# Patient Record
Sex: FEMALE | Race: WHITE | NOT HISPANIC OR LATINO | Employment: UNEMPLOYED | URBAN - METROPOLITAN AREA
[De-identification: names, ages, dates, MRNs, and addresses within clinical notes are randomized per-mention and may not be internally consistent; named-entity substitution may affect disease eponyms.]

---

## 2023-04-05 ENCOUNTER — APPOINTMENT (OUTPATIENT)
Dept: PRIMARY CARE | Facility: CLINIC | Age: 53
End: 2023-04-05
Payer: COMMERCIAL

## 2023-04-06 PROBLEM — R53.83 FATIGUE: Status: ACTIVE | Noted: 2023-04-06

## 2023-04-06 PROBLEM — N94.3 PREMENSTRUAL SYNDROME: Status: ACTIVE | Noted: 2023-04-06

## 2023-04-06 PROBLEM — S05.02XA LEFT CORNEAL ABRASION: Status: ACTIVE | Noted: 2023-04-06

## 2023-04-06 PROBLEM — B34.9 VIRAL ILLNESS: Status: ACTIVE | Noted: 2023-04-06

## 2023-04-06 PROBLEM — H57.10 EYE PAIN: Status: ACTIVE | Noted: 2023-04-06

## 2023-04-06 PROBLEM — E55.9 VITAMIN D DEFICIENCY: Status: ACTIVE | Noted: 2023-04-06

## 2023-04-06 PROBLEM — R10.9 ABDOMINAL PAIN: Status: ACTIVE | Noted: 2023-04-06

## 2023-04-06 PROBLEM — F41.9 ANXIETY: Status: ACTIVE | Noted: 2023-04-06

## 2023-04-06 RX ORDER — PAROXETINE 30 MG/1
30 TABLET, FILM COATED ORAL DAILY
COMMUNITY
End: 2023-04-12

## 2023-04-06 RX ORDER — BUSPIRONE HYDROCHLORIDE 5 MG/1
1 TABLET ORAL 2 TIMES DAILY
COMMUNITY
Start: 2022-05-23 | End: 2024-04-08 | Stop reason: ALTCHOICE

## 2023-04-07 ENCOUNTER — APPOINTMENT (OUTPATIENT)
Dept: PRIMARY CARE | Facility: CLINIC | Age: 53
End: 2023-04-07
Payer: COMMERCIAL

## 2023-04-07 DIAGNOSIS — F41.9 ANXIETY DISORDER, UNSPECIFIED: ICD-10-CM

## 2023-04-12 ENCOUNTER — APPOINTMENT (OUTPATIENT)
Dept: PRIMARY CARE | Facility: CLINIC | Age: 53
End: 2023-04-12
Payer: COMMERCIAL

## 2023-04-12 RX ORDER — PAROXETINE 30 MG/1
30 TABLET, FILM COATED ORAL DAILY
Qty: 90 TABLET | Refills: 1 | Status: SHIPPED | OUTPATIENT
Start: 2023-04-12 | End: 2023-09-21 | Stop reason: SDUPTHER

## 2023-09-20 ENCOUNTER — TELEPHONE (OUTPATIENT)
Dept: PRIMARY CARE | Facility: CLINIC | Age: 53
End: 2023-09-20
Payer: COMMERCIAL

## 2023-09-20 DIAGNOSIS — F41.9 ANXIETY DISORDER, UNSPECIFIED: ICD-10-CM

## 2023-09-20 NOTE — TELEPHONE ENCOUNTER
PT is coming in this Friday, but needs a few pills called in to get her to appointment.    PARoxetine (Paxil) 30 mg tablet

## 2023-09-22 ENCOUNTER — APPOINTMENT (OUTPATIENT)
Dept: PRIMARY CARE | Facility: CLINIC | Age: 53
End: 2023-09-22
Payer: COMMERCIAL

## 2023-09-22 RX ORDER — PAROXETINE 30 MG/1
30 TABLET, FILM COATED ORAL DAILY
Qty: 90 TABLET | Refills: 1 | Status: SHIPPED | OUTPATIENT
Start: 2023-09-22 | End: 2024-04-08 | Stop reason: SDUPTHER

## 2024-04-08 ENCOUNTER — OFFICE VISIT (OUTPATIENT)
Dept: PRIMARY CARE | Facility: CLINIC | Age: 54
End: 2024-04-08
Payer: COMMERCIAL

## 2024-04-08 ENCOUNTER — TELEPHONE (OUTPATIENT)
Dept: PRIMARY CARE | Facility: CLINIC | Age: 54
End: 2024-04-08

## 2024-04-08 VITALS
HEART RATE: 87 BPM | SYSTOLIC BLOOD PRESSURE: 129 MMHG | BODY MASS INDEX: 27.31 KG/M2 | OXYGEN SATURATION: 98 % | WEIGHT: 160 LBS | DIASTOLIC BLOOD PRESSURE: 78 MMHG | HEIGHT: 64 IN | TEMPERATURE: 97.1 F

## 2024-04-08 DIAGNOSIS — F41.8 DEPRESSION WITH ANXIETY: Primary | ICD-10-CM

## 2024-04-08 DIAGNOSIS — Z00.01 ANNUAL VISIT FOR GENERAL ADULT MEDICAL EXAMINATION WITH ABNORMAL FINDINGS: ICD-10-CM

## 2024-04-08 DIAGNOSIS — Z12.11 COLON CANCER SCREENING: ICD-10-CM

## 2024-04-08 DIAGNOSIS — F17.200 SMOKER: ICD-10-CM

## 2024-04-08 DIAGNOSIS — I10 HYPERTENSION, UNSPECIFIED TYPE: ICD-10-CM

## 2024-04-08 DIAGNOSIS — Z12.31 ENCOUNTER FOR SCREENING MAMMOGRAM FOR BREAST CANCER: ICD-10-CM

## 2024-04-08 DIAGNOSIS — F17.210 CONTINUOUS DEPENDENCE ON CIGARETTE SMOKING: ICD-10-CM

## 2024-04-08 DIAGNOSIS — Z12.2 ENCOUNTER FOR SCREENING FOR LUNG CANCER: ICD-10-CM

## 2024-04-08 DIAGNOSIS — F41.9 ANXIETY: ICD-10-CM

## 2024-04-08 DIAGNOSIS — N94.3 PREMENSTRUAL SYNDROME: ICD-10-CM

## 2024-04-08 DIAGNOSIS — E55.9 VITAMIN D DEFICIENCY: ICD-10-CM

## 2024-04-08 PROCEDURE — 99407 BEHAV CHNG SMOKING > 10 MIN: CPT | Performed by: INTERNAL MEDICINE

## 2024-04-08 PROCEDURE — 99214 OFFICE O/P EST MOD 30 MIN: CPT | Performed by: INTERNAL MEDICINE

## 2024-04-08 PROCEDURE — 3078F DIAST BP <80 MM HG: CPT | Performed by: INTERNAL MEDICINE

## 2024-04-08 PROCEDURE — 3074F SYST BP LT 130 MM HG: CPT | Performed by: INTERNAL MEDICINE

## 2024-04-08 RX ORDER — PAROXETINE 10 MG/1
10 TABLET, FILM COATED ORAL EVERY MORNING
Qty: 30 TABLET | Refills: 1 | Status: SHIPPED | OUTPATIENT
Start: 2024-04-08 | End: 2024-04-08 | Stop reason: SDUPTHER

## 2024-04-08 RX ORDER — PAROXETINE 30 MG/1
30 TABLET, FILM COATED ORAL DAILY
Qty: 30 TABLET | Refills: 0 | Status: SHIPPED | OUTPATIENT
Start: 2024-04-08 | End: 2024-04-08 | Stop reason: WASHOUT

## 2024-04-08 RX ORDER — IBUPROFEN 200 MG
1 TABLET ORAL EVERY 24 HOURS
Qty: 7 PATCH | Refills: 0 | Status: SHIPPED | OUTPATIENT
Start: 2024-04-08

## 2024-04-08 RX ORDER — PAROXETINE 10 MG/1
30 TABLET, FILM COATED ORAL EVERY MORNING
Qty: 90 TABLET | Refills: 1 | Status: SHIPPED | OUTPATIENT
Start: 2024-04-08 | End: 2024-04-08 | Stop reason: WASHOUT

## 2024-04-08 RX ORDER — NICOTINE 7MG/24HR
1 PATCH, TRANSDERMAL 24 HOURS TRANSDERMAL EVERY 24 HOURS
Qty: 7 PATCH | Refills: 0 | Status: SHIPPED | OUTPATIENT
Start: 2024-04-08

## 2024-04-08 NOTE — ASSESSMENT & PLAN NOTE
I spent 10 minutes counseling patient about need for smoking/tobacco cessation and support discuss the nicotine  replacement therapy ,varenicline,bupropion,  hypnosis, support group, acupuncture as a potential options  .  I discussed smoking history/status that determined patient with criteria for lung cancer screening with a low-dose CT scan. By using shared decision  making we  determinded the patient will benefit from the screening, including discussion of benefit and harms of screening, follow-up diagnostic testing, overt diagnosis, false positive rate, and total radiation exposure. I counseled the patient on the importance of adhering to a annul lung cancer low-dose CT scan screening, the impact off comorbidities, and inability or unwillingness to undergo's diagnosis and treatment if abnormality discovered. I provided patient an order for low-dose CT lung cancer screening    I spent 8 minutes counseling the patient on the importance of abstaining from the tobacco/cigarette smoking and  provided information about tobacco cessation intervention I provided patient an order for tobacco suggestion therapy

## 2024-04-08 NOTE — PROGRESS NOTES
Subjective   Patient ID: Caridad Delcid is a 54 y.o. female who presents for Follow-up (Med refill ).  Discussed about smoking and well visit this is a 54-year-old patient    Assessment/Plan     Problem List Items Addressed This Visit       Depression with anxiety - Primary     Depression is chronic and quite common and notorious mental health disorder and it is quite common and widespread, there are several therapeutics available for depression now a days. It is considered as chemical imbalance disorder and with medications , it can be adjusted. There are side effects from SSRI and SNRIs but on long term, they are well tolerated. Please do not feel awkward or shy to contact us if feel tired, sleepy, lack of energy or aloof/ alone. Untreated depression can bring serious consequences including suicidal ideations, mental health counselling is available if need arises. Usually treatment of depression is long lasting therapy with periodic evaluations and follow ups. Pt with depression no longer have to feel frustrated, helpless or isolated.Detailed discussion was carried out.           Premenstrual syndrome    Vitamin D deficiency    Continuous dependence on cigarette smoking     I spent 10 minutes counseling patient about need for smoking/tobacco cessation and support discuss the nicotine  replacement therapy ,varenicline,bupropion,  hypnosis, support group, acupuncture as a potential options  .  I discussed smoking history/status that determined patient with criteria for lung cancer screening with a low-dose CT scan. By using shared decision  making we  determinded the patient will benefit from the screening, including discussion of benefit and harms of screening, follow-up diagnostic testing, overt diagnosis, false positive rate, and total radiation exposure. I counseled the patient on the importance of adhering to a annul lung cancer low-dose CT scan screening, the impact off comorbidities, and inability or  unwillingness to undergo's diagnosis and treatment if abnormality discovered. I provided patient an order for low-dose CT lung cancer screening    I spent 8 minutes counseling the patient on the importance of abstaining from the tobacco/cigarette smoking and  provided information about tobacco cessation intervention I provided patient an order for tobacco suggestion therapy             Annual visit for general adult medical examination with abnormal findings    Relevant Medications    nicotine (Nicoderm CQ) 7 mg/24 hr patch    nicotine (Nicoderm CQ) 14 mg/24 hr patch    nicotine (Nicoderm CQ) 21 mg/24 hr patch    Other Relevant Orders    CBC and Auto Differential    Comprehensive Metabolic Panel    Hemoglobin A1C    Lipid Panel    Magnesium    TSH with reflex to Free T4 if abnormal    Uric Acid    Microscopic Only, Urine    BI mammo bilateral screening    Colonoscopy Screening; Average Risk Patient    CT cardiac scoring wo IV contrast    Vitamin D 25-Hydroxy,Total (for eval of Vitamin D levels)    Iron and TIBC    HIV 1/2 Antigen/Antibody Screen with Reflex to Confirmation    CT lung screening low dose    Smoker    Relevant Medications    nicotine (Nicoderm CQ) 7 mg/24 hr patch    nicotine (Nicoderm CQ) 14 mg/24 hr patch    nicotine (Nicoderm CQ) 21 mg/24 hr patch    Other Relevant Orders    CT lung screening low dose     Other Visit Diagnoses       Hypertension, unspecified type        Colon cancer screening        Relevant Orders    Colonoscopy Screening; Average Risk Patient            HPI living with a boyfriend to have 3 children    No brother 1 sister    Mother have ovarian cancer past    Father  of some kind of cardiomyopathy or heart disease at young age    Continue to smoke anxiety depression insomnia fatigue tired cough congestions chronic fatigue    Negative for hematuria rectal bleeding    Negative for cyanosis hemoptysis or weight loss    Negative for any suicide homicide ideation.  History  reviewed. No pertinent past medical history.  Past Surgical History:   Procedure Laterality Date    APPENDECTOMY  2015    Appendectomy    BREAST LUMPECTOMY  2015    Breast Surgery Lumpectomy     SECTION, CLASSIC  2015     Section    HYSTERECTOMY  2015    Hysterectomy     Allergies   Allergen Reactions    Sulfa (Sulfonamide Antibiotics) Unknown     Current Outpatient Medications   Medication Sig Dispense Refill    nicotine (Nicoderm CQ) 14 mg/24 hr patch Place 1 patch over 24 hours on the skin once every 24 hours. 7 patch 0    nicotine (Nicoderm CQ) 21 mg/24 hr patch Place 1 patch over 24 hours on the skin once every 24 hours. 7 patch 0    nicotine (Nicoderm CQ) 7 mg/24 hr patch Place 1 patch over 24 hours on the skin once every 24 hours. 7 patch 0     No current facility-administered medications for this visit.     Family History   Problem Relation Name Age of Onset    Ovarian cancer Mother      Other (CARDIAC DISORDER) Father       Social History     Socioeconomic History    Marital status:      Spouse name: None    Number of children: None    Years of education: None    Highest education level: None   Occupational History    None   Tobacco Use    Smoking status: Every Day     Packs/day: .5     Types: Cigarettes    Smokeless tobacco: Never   Substance and Sexual Activity    Alcohol use: Never    Drug use: Never    Sexual activity: None   Other Topics Concern    None   Social History Narrative    None     Social Determinants of Health     Financial Resource Strain: Not on file   Food Insecurity: Not on file   Transportation Needs: Not on file   Physical Activity: Not on file   Stress: Not on file   Social Connections: Not on file   Intimate Partner Violence: Not on file   Housing Stability: Not on file       There is no immunization history on file for this patient.    Review of Systems  Review of systems is otherwise negative unless stated above or in history of  "present illness.    Objective   Visit Vitals  /78 (BP Location: Left arm, Patient Position: Sitting, BP Cuff Size: Large adult)   Pulse 87   Temp 36.2 °C (97.1 °F)   Ht 1.626 m (5' 4\")   Wt 72.6 kg (160 lb)   SpO2 98%   BMI 27.46 kg/m²   Smoking Status Every Day   BSA 1.81 m²     Physical Exam  Constitutional:       General: not in acute distress.   HENT:      Head: Normocephalic and atraumatic.      Nose: Nose normal.   Eyes:      Extraocular Movements: Extraocular movements intact.      Conjunctiva/sclera: Conjunctivae normal.   Cardiovascular:      Rate and Rhythm: Normal rate ,  No M/R/G  Pulmonary: Crackles rales from     Effort: Pulmonary effort is normal.      Breath sounds: Normal, Bilat Equal AE  Skin:     General: Skin is warm.   Neurological:      Mental Status: He is alert and oriented to person, place, and time.   Psychiatric:   Anxiety depression without suicide homicide ideation   Mood and Affect: Mood normal.         Behavior: Behavior normal.   Musculoskeletal   FROM in all extremitirs,  Joint-no swelling or tenderness    No visits with results within 4 Month(s) from this visit.   Latest known visit with results is:   [facility] Encounter on 12/06/2020   Component Date Value Ref Range Status    SARS-CoV-2 Result 12/06/2020 NOT DETECTED  Not Detected Final    Date of Symptom Onset? (YYYYMMDD)? 12/06/2020 20,201,204   Final       Radiology: Reviewed imaging in powerchart.  No results found.      Charting was completed using voice recognition technology and may include unintended errors.       "

## 2024-04-08 NOTE — PROGRESS NOTES
Subjective   Patient ID: Caridad Delcid is a 54 y.o. female who presents for Follow-up (Med refill ).    Assessment/Plan     Problem List Items Addressed This Visit       Depression with anxiety - Primary     Depression is chronic and quite common and notorious mental health disorder and it is quite common and widespread, there are several therapeutics available for depression now a days. It is considered as chemical imbalance disorder and with medications , it can be adjusted. There are side effects from SSRI and SNRIs but on long term, they are well tolerated. Please do not feel awkward or shy to contact us if feel tired, sleepy, lack of energy or aloof/ alone. Untreated depression can bring serious consequences including suicidal ideations, mental health counselling is available if need arises. Usually treatment of depression is long lasting therapy with periodic evaluations and follow ups. Pt with depression no longer have to feel frustrated, helpless or isolated.Detailed discussion was carried out.           Premenstrual syndrome    Vitamin D deficiency    Continuous dependence on cigarette smoking     I spent 10 minutes counseling patient about need for smoking/tobacco cessation and support discuss the nicotine  replacement therapy ,varenicline,bupropion,  hypnosis, support group, acupuncture as a potential options  .  I discussed smoking history/status that determined patient with criteria for lung cancer screening with a low-dose CT scan. By using shared decision  making we  determinded the patient will benefit from the screening, including discussion of benefit and harms of screening, follow-up diagnostic testing, overt diagnosis, false positive rate, and total radiation exposure. I counseled the patient on the importance of adhering to a annul lung cancer low-dose CT scan screening, the impact off comorbidities, and inability or unwillingness to undergo's diagnosis and treatment if abnormality discovered.  I provided patient an order for low-dose CT lung cancer screening    I spent 8 minutes counseling the patient on the importance of abstaining from the tobacco/cigarette smoking and  provided information about tobacco cessation intervention I provided patient an order for tobacco suggestion therapy             Annual visit for general adult medical examination with abnormal findings    Relevant Medications    nicotine (Nicoderm CQ) 7 mg/24 hr patch    nicotine (Nicoderm CQ) 14 mg/24 hr patch    nicotine (Nicoderm CQ) 21 mg/24 hr patch    Other Relevant Orders    CBC and Auto Differential    Comprehensive Metabolic Panel    Hemoglobin A1C    Lipid Panel    Magnesium    TSH with reflex to Free T4 if abnormal    Uric Acid    Microscopic Only, Urine    BI mammo bilateral screening    Colonoscopy Screening; Average Risk Patient    CT cardiac scoring wo IV contrast    Vitamin D 25-Hydroxy,Total (for eval of Vitamin D levels)    Iron and TIBC    HIV 1/2 Antigen/Antibody Screen with Reflex to Confirmation    CT lung screening low dose    Smoker    Relevant Medications    nicotine (Nicoderm CQ) 7 mg/24 hr patch    nicotine (Nicoderm CQ) 14 mg/24 hr patch    nicotine (Nicoderm CQ) 21 mg/24 hr patch    Other Relevant Orders    CT lung screening low dose     Other Visit Diagnoses       Hypertension, unspecified type        Colon cancer screening        Relevant Orders    Colonoscopy Screening; Average Risk Patient            HPI this is a 54-year-old patient who is living with a boyfriend    Her 3 children    No brother 1 sister good health    Mother have ovarian cancer    Father  from the dilated cardiomyopathy    Continues to smoke had anxiety depression without any suicidal    Complaining of chronic fatigue decreased ADL mobility arthralgia myalgia fatigue tired weakness    Discussed about vaccination preventive care advised to get eye checkup dental checkup OB/GYN and mammogram colonoscopy also get a scan for the lung  and heart from the smoking and family history of cardiomyopathy    Negative for hematuria hemoptysis rectal bleeding.  Negative for suicide  History reviewed. No pertinent past medical history.  Past Surgical History:   Procedure Laterality Date    APPENDECTOMY  2015    Appendectomy    BREAST LUMPECTOMY  2015    Breast Surgery Lumpectomy     SECTION, CLASSIC  2015     Section    HYSTERECTOMY  2015    Hysterectomy     Allergies   Allergen Reactions    Sulfa (Sulfonamide Antibiotics) Unknown     Current Outpatient Medications   Medication Sig Dispense Refill    nicotine (Nicoderm CQ) 14 mg/24 hr patch Place 1 patch over 24 hours on the skin once every 24 hours. 7 patch 0    nicotine (Nicoderm CQ) 21 mg/24 hr patch Place 1 patch over 24 hours on the skin once every 24 hours. 7 patch 0    nicotine (Nicoderm CQ) 7 mg/24 hr patch Place 1 patch over 24 hours on the skin once every 24 hours. 7 patch 0     No current facility-administered medications for this visit.     Family History   Problem Relation Name Age of Onset    Ovarian cancer Mother      Other (CARDIAC DISORDER) Father       Social History     Socioeconomic History    Marital status:      Spouse name: None    Number of children: None    Years of education: None    Highest education level: None   Occupational History    None   Tobacco Use    Smoking status: Every Day     Packs/day: .5     Types: Cigarettes    Smokeless tobacco: Never   Substance and Sexual Activity    Alcohol use: Never    Drug use: Never    Sexual activity: None   Other Topics Concern    None   Social History Narrative    None     Social Determinants of Health     Financial Resource Strain: Not on file   Food Insecurity: Not on file   Transportation Needs: Not on file   Physical Activity: Not on file   Stress: Not on file   Social Connections: Not on file   Intimate Partner Violence: Not on file   Housing Stability: Not on file       There is no  "immunization history on file for this patient.    Review of Systems  Review of systems is otherwise negative unless stated above or in history of present illness.    Objective   Visit Vitals  /78 (BP Location: Left arm, Patient Position: Sitting, BP Cuff Size: Large adult)   Pulse 87   Temp 36.2 °C (97.1 °F)   Ht 1.626 m (5' 4\")   Wt 72.6 kg (160 lb)   SpO2 98%   BMI 27.46 kg/m²   Smoking Status Every Day   BSA 1.81 m²     Physical Exam  Constitutional:       General: not in acute distress.  Anxiety   HENT:      Head: Normocephalic and atraumatic.      Nose: Nose normal.   Eyes:      Extraocular Movements: Extraocular movements intact.      Conjunctiva/sclera: Conjunctivae normal.   Cardiovascular: Heart murmur     Rate and Rhythm: Normal rate ,  No M/R/G  Pulmonary: Crackles rhonchi     Effort: Pulmonary effort is normal.      Breath sounds: Normal, Bilat Equal AE  Skin:     General: Skin is warm.   Neurological:      Mental Status: He is alert and oriented to person, place, and time.   Psychiatric:         Mood and Affect: Mood normal.         Behavior: Behavior normal.   Musculoskeletal   FROM in all extremitirs,  Joint-no swelling or tenderness    No visits with results within 4 Month(s) from this visit.   Latest known visit with results is:   [facility] Encounter on 12/06/2020   Component Date Value Ref Range Status    SARS-CoV-2 Result 12/06/2020 NOT DETECTED  Not Detected Final    Date of Symptom Onset? (YYYYMMDD)? 12/06/2020 20,201,204   Final       Radiology: Reviewed imaging in powerchart.  No results found.      Charting was completed using voice recognition technology and may include unintended errors.       "

## 2024-05-30 LAB
NON-UH HIE A/G RATIO: 0.9
NON-UH HIE ALB: 3.5 G/DL (ref 3.4–5)
NON-UH HIE ALK PHOS: 100 UNIT/L (ref 45–117)
NON-UH HIE BASO COUNT: 0.01 X1000 (ref 0–0.2)
NON-UH HIE BASOS %: 0.2 %
NON-UH HIE BILIRUBIN, TOTAL: 0.2 MG/DL (ref 0.3–1.2)
NON-UH HIE BUN/CREAT RATIO: 13.3
NON-UH HIE BUN: 8 MG/DL (ref 9–23)
NON-UH HIE CALCIUM: 9.1 MG/DL (ref 8.7–10.4)
NON-UH HIE CALCULATED LDL CHOLESTEROL: 63 MG/DL (ref 60–130)
NON-UH HIE CALCULATED OSMOLALITY: 274 MOSM/KG (ref 275–295)
NON-UH HIE CHLORIDE: 105 MMOL/L (ref 98–107)
NON-UH HIE CHOLESTEROL: 157 MG/DL (ref 100–200)
NON-UH HIE CO2, VENOUS: 28 MMOL/L (ref 20–31)
NON-UH HIE CREATININE: 0.6 MG/DL (ref 0.5–0.8)
NON-UH HIE DIFF?: NO
NON-UH HIE EOS COUNT: 0.01 X1000 (ref 0–0.5)
NON-UH HIE EOSIN %: 0.2 %
NON-UH HIE GFR AA: >60
NON-UH HIE GLOBULIN: 3.8 G/DL
NON-UH HIE GLOMERULAR FILTRATION RATE: >60 ML/MIN/1.73M?
NON-UH HIE GLUCOSE: 90 MG/DL (ref 74–106)
NON-UH HIE GOT: 30 UNIT/L (ref 15–37)
NON-UH HIE GPT: 27 UNIT/L (ref 10–49)
NON-UH HIE HCT: 46.1 % (ref 36–46)
NON-UH HIE HDL CHOLESTEROL: 35 MG/DL (ref 40–60)
NON-UH HIE HGB A1C: 5 %
NON-UH HIE HGB: 15.4 G/DL (ref 12–16)
NON-UH HIE INSTR WBC: 6.1
NON-UH HIE IRON: 61 UG/DL (ref 50–170)
NON-UH HIE K: 4.4 MMOL/L (ref 3.5–5.1)
NON-UH HIE LYMPH %: 47.2 %
NON-UH HIE LYMPH COUNT: 2.86 X1000 (ref 1.2–4.8)
NON-UH HIE MAGNESIUM: 1.7 MG/DL (ref 1.6–2.6)
NON-UH HIE MCH: 27.5 PG (ref 27–34)
NON-UH HIE MCHC: 33.5 G/DL (ref 32–37)
NON-UH HIE MCV: 82.3 FL (ref 80–100)
NON-UH HIE MONO %: 7.4 %
NON-UH HIE MONO COUNT: 0.45 X1000 (ref 0.1–1)
NON-UH HIE MPV: 8.8 FL (ref 7.4–10.4)
NON-UH HIE NA: 138 MMOL/L (ref 135–145)
NON-UH HIE NEUTROPHIL %: 45 %
NON-UH HIE NEUTROPHIL COUNT (ANC): 2.72 X1000 (ref 1.4–8.8)
NON-UH HIE NUCLEATED RBC: 0 /100WBC
NON-UH HIE PLATELET: 238 X10 (ref 150–450)
NON-UH HIE RBC: 5.59 X10 (ref 4.2–5.4)
NON-UH HIE RDW: 13.5 % (ref 11.5–14.5)
NON-UH HIE SATURATION: 20.5 % (ref 20–50)
NON-UH HIE TIBC: 297 UG/ML (ref 250–425)
NON-UH HIE TOTAL CHOL/HDL CHOL RATIO: 4.5
NON-UH HIE TOTAL PROTEIN: 7.3 G/DL (ref 5.7–8.2)
NON-UH HIE TRIGLYCERIDES: 295 MG/DL (ref 30–150)
NON-UH HIE TSH: 3.23 UIU/ML (ref 0.55–4.78)
NON-UH HIE URIC ACID: 4.8 MG/DL (ref 3.1–7.8)
NON-UH HIE VIT D 25: 21 NG/ML
NON-UH HIE WBC: 6.1 X10 (ref 4.5–11)

## 2024-05-31 DIAGNOSIS — E55.9 VITAMIN D DEFICIENCY: ICD-10-CM

## 2024-05-31 DIAGNOSIS — F41.8 DEPRESSION WITH ANXIETY: ICD-10-CM

## 2024-05-31 RX ORDER — PAROXETINE 30 MG/1
30 TABLET, FILM COATED ORAL DAILY
Qty: 90 TABLET | Refills: 1 | Status: SHIPPED | OUTPATIENT
Start: 2024-05-31

## 2024-05-31 RX ORDER — PAROXETINE 30 MG/1
30 TABLET, FILM COATED ORAL DAILY
COMMUNITY
Start: 2024-04-08 | End: 2024-05-31 | Stop reason: SDUPTHER

## 2024-05-31 RX ORDER — ERGOCALCIFEROL 1.25 MG/1
50000 CAPSULE ORAL
Qty: 12 CAPSULE | Refills: 0 | Status: SHIPPED | OUTPATIENT
Start: 2024-06-02

## 2024-05-31 NOTE — TELEPHONE ENCOUNTER
SPOKE WITH PATIENT AND SHE HAS BEEN GIVEN 'S MESSAGE. SHE WOULD LIKE HER VIT D TO BE SENT TO   [facility] AND SHE IS ASKING YOU TO SEND IN A REFILL OF PAXIL.

## 2024-05-31 NOTE — TELEPHONE ENCOUNTER
----- Message from Vicky Krueger MD sent at 5/31/2024 11:52 AM EDT -----  Triglyceride 295 advise low-fat diet  Advice your vitamin D level is low take vitamin C calcium plus vitamin D 50,000 unit one every week for 3 months and repeat testing or 3 months follow-up 3 months

## 2024-06-11 ENCOUNTER — TELEPHONE (OUTPATIENT)
Dept: PRIMARY CARE | Facility: CLINIC | Age: 54
End: 2024-06-11
Payer: COMMERCIAL

## 2025-03-02 DIAGNOSIS — F41.8 DEPRESSION WITH ANXIETY: ICD-10-CM

## 2025-03-04 NOTE — TELEPHONE ENCOUNTER
Tried calling patient, No VM option and no . Needs appt for further refills. Was last seen a while ago 5/23/22

## 2025-03-05 RX ORDER — PAROXETINE 30 MG/1
30 TABLET, FILM COATED ORAL DAILY
Qty: 90 TABLET | Refills: 1 | Status: SHIPPED | OUTPATIENT
Start: 2025-03-05

## 2025-06-06 ENCOUNTER — TELEPHONE (OUTPATIENT)
Dept: PRIMARY CARE | Facility: CLINIC | Age: 55
End: 2025-06-06
Payer: COMMERCIAL

## 2025-06-06 DIAGNOSIS — Z12.11 ENCOUNTER FOR SCREENING COLONOSCOPY: ICD-10-CM

## 2025-06-06 DIAGNOSIS — Z12.11 COLON CANCER SCREENING: ICD-10-CM

## 2025-07-30 LAB — NONINV COLON CA DNA+OCC BLD SCRN STL QL: NEGATIVE
